# Patient Record
Sex: FEMALE | Race: BLACK OR AFRICAN AMERICAN | NOT HISPANIC OR LATINO | Employment: OTHER | URBAN - METROPOLITAN AREA
[De-identification: names, ages, dates, MRNs, and addresses within clinical notes are randomized per-mention and may not be internally consistent; named-entity substitution may affect disease eponyms.]

---

## 2023-06-13 RX ORDER — ASPIRIN 81 MG/1
81 TABLET ORAL DAILY
COMMUNITY

## 2023-06-13 RX ORDER — AMLODIPINE BESYLATE 10 MG/1
10 TABLET ORAL DAILY
COMMUNITY

## 2023-06-13 RX ORDER — ALLOPURINOL 100 MG/1
200 TABLET ORAL DAILY
COMMUNITY

## 2023-06-13 RX ORDER — DONEPEZIL HYDROCHLORIDE 10 MG/1
10 TABLET, FILM COATED ORAL AT BEDTIME
COMMUNITY

## 2023-06-13 RX ORDER — ATORVASTATIN CALCIUM 20 MG/1
20 TABLET, FILM COATED ORAL DAILY
COMMUNITY

## 2023-06-13 RX ORDER — QUETIAPINE FUMARATE 25 MG/1
25 TABLET, FILM COATED ORAL 2 TIMES DAILY
COMMUNITY

## 2023-06-13 RX ORDER — ATENOLOL 50 MG/1
50 TABLET ORAL DAILY
COMMUNITY

## 2023-06-13 RX ORDER — TORSEMIDE 20 MG/1
20 TABLET ORAL DAILY
COMMUNITY

## 2023-06-14 ENCOUNTER — APPOINTMENT (OUTPATIENT)
Dept: GENERAL RADIOLOGY | Facility: CLINIC | Age: 88
End: 2023-06-14
Attending: PODIATRIST
Payer: MEDICARE

## 2023-06-14 ENCOUNTER — HOSPITAL ENCOUNTER (OUTPATIENT)
Facility: CLINIC | Age: 88
Discharge: HOME | End: 2023-06-14
Attending: PODIATRIST | Admitting: PODIATRIST
Payer: MEDICARE

## 2023-06-14 VITALS
DIASTOLIC BLOOD PRESSURE: 85 MMHG | OXYGEN SATURATION: 100 % | SYSTOLIC BLOOD PRESSURE: 154 MMHG | BODY MASS INDEX: 18.72 KG/M2 | TEMPERATURE: 98.5 F | WEIGHT: 119.3 LBS | HEIGHT: 67 IN | HEART RATE: 77 BPM | RESPIRATION RATE: 14 BRPM

## 2023-06-14 DIAGNOSIS — M20.41 HAMMERTOE OF RIGHT FOOT: ICD-10-CM

## 2023-06-14 DIAGNOSIS — R26.89 UNABLE TO BEAR WEIGHT ON RIGHT LOWER EXTREMITY: Primary | ICD-10-CM

## 2023-06-14 PROCEDURE — 999N000179 XR SURGERY CARM FLUORO LESS THAN 5 MIN W STILLS

## 2023-06-14 PROCEDURE — 360N000083 HC SURGERY LEVEL 3 W/ FLUORO, PER MIN: Performed by: PODIATRIST

## 2023-06-14 PROCEDURE — 250N000009 HC RX 250: Performed by: PODIATRIST

## 2023-06-14 PROCEDURE — 999N000063 XR FOOT PORT RIGHT 2 VIEWS: Mod: RT

## 2023-06-14 PROCEDURE — 272N000001 HC OR GENERAL SUPPLY STERILE: Performed by: PODIATRIST

## 2023-06-14 PROCEDURE — 710N000012 HC RECOVERY PHASE 2, PER MINUTE: Performed by: PODIATRIST

## 2023-06-14 PROCEDURE — 999N000141 HC STATISTIC PRE-PROCEDURE NURSING ASSESSMENT: Performed by: PODIATRIST

## 2023-06-14 RX ORDER — LIDOCAINE HYDROCHLORIDE 20 MG/ML
INJECTION, SOLUTION INFILTRATION; PERINEURAL PRN
Status: DISCONTINUED | OUTPATIENT
Start: 2023-06-14 | End: 2023-06-14 | Stop reason: HOSPADM

## 2023-06-14 RX ORDER — BUPIVACAINE HYDROCHLORIDE 5 MG/ML
INJECTION, SOLUTION PERINEURAL PRN
Status: DISCONTINUED | OUTPATIENT
Start: 2023-06-14 | End: 2023-06-14 | Stop reason: HOSPADM

## 2023-06-14 ASSESSMENT — ACTIVITIES OF DAILY LIVING (ADL)
ADLS_ACUITY_SCORE: 36
ADLS_ACUITY_SCORE: 35

## 2023-06-14 NOTE — DISCHARGE INSTRUCTIONS
Reasons to contact your surgeon:    Signs of possible infection: Check your incision daily for redness, swelling, warmth, red streaks or foul drainage.   Elevated temperature.  Pain not controlled with pain medication and/or rest.   Uncontrolled nausea or vomiting.  Any questions or concerns.       **If you have questions or concerns about your procedure,   call Dr. Wei  [phone]**
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
## 2023-06-14 NOTE — PROGRESS NOTES
[facility] report completed, #[id number].     IKE Duenas, [facility]  [phone] Desk phone  [phone] Cell/text (Preferred)  [facility]

## 2023-06-14 NOTE — OP NOTE
Surgeon:  Na Wei DPM     Procedure:      1. Proximal interphalangeal arthroplasty 2nd digit right foot      Pre-op diagnosis:?  1. Hammertoe deformity 2nd digit right foot  2. Nonhealing wound/Pre-ulcerative callus 2nd digit right foot     Post-op diagnosis:?same      Anesthesia: local      Hemostasis: none     Estimated blood loss: 5cc     Specimen: none     Indications: This is an 89 years old female presents complaining of pain right foot due to painful hammertoe 2nd digit right foot. At the same time, she has been dealing with a pre-ulcerative lesion medial 2nd digit at the PIPJ level that has become an open wound on and off. She has dementia and sometimes is not able to communicate well. Clinically, she has severe rigid hammertoe 2nd digit right foot and mild bunion deformity. She has significant pain on palpation at the callus and at the PIPJ. She doesn't have any pain at the bunion site. On X-ray, there is severe hammertoe deformity of the 2nd digit with significant arthritis and dislocation at the PIPJ. This area corresponds to the corn/ulcer formation and rubbing against the hallux. We discussed hammertoe surgery in detail. I discussed an arthroplasty of the 2nd digit without use of fixation and this can be done under local. I discussed that this would help resolve the pain from the arthritis and the formation of the corn at the area, but won't get rid of the problem 100% due to the presence of the bunion deformity rubbing against the 2nd toe and that there is a risk of recurrence. She verbalizes understanding. Her daughter reported that in the past 2 weeks leading to surgery today, patient has been acting weird due to her dementia and she is afraid of post-op compliance. The procedure however still deems necessary due to the presence of wound and pain with concern for infection in the future. Patient was seen pre-operatively, procedure and post-op course were discussed as well as alternatives, risks  and complications. No guarantees were given. All questions were answered to the patient s satisfactions. Pt agrees to comply and opts to proceed with the operation.       Report of operation:       Patient was brought into the operating room, properly identified and placed on the operating room table in a supine position. Following administration of local anesthesia (10cc of 1:1 2% lidocaine and 0.5% marcaine plain), the right foot was then scrubbed, prepped and draped in the usual sterile and aseptic manner.       On the 2nd toe, two semi elliptical incision was made dorsally on top of the PIPJ.  The incision was deepened in the same plane with care taken to identify and retract all vital neurovascular structures.? All superficial bleeders were cauterized as necessary. The extensor tendon was transected and a transverse capsulotomy was performed and at this time, the head of the proximal phalanx was clearly visualized. There is severe degenerative change to the head of the proximal phalanx as well as to the middle phalanx. The head of the proximal phalanx was freed from its capsular and periosteal attachments.  The head of the proximal phalanx was then resected with a sagittal saw. It was also used to remodel the middle phalanx. A bone rongeur was also utilized to remove spurring formation at the base of the middle phalanx. A rasp was used to smooth down the remaining bones and no sharp edges were noted.  I palpated the area and there were no more bony prominences. At this point, the foot was loaded and it was noted that adequate correction of the deformity was achieved. The pre-ulcerative callus at the medial aspect was pared with #15 blade.        The wounds were then copiously flushed with normal sterile saline.?The EDL tendon was repaired with 4-0 vicryl suture, and the skin was closed with 4-0 nylon sutures.    The incision site was then dressed with adaptic, 4 x 4, kerlix, ACE wrap. Adequate perfusion was  noted to all distal digits.       The patient tolerated the anesthesia and procedure well, without complications, left the operating room with vital signs stable and vascular status intact to all digits of the right foot. Following a period of post-operative monitoring, will be discharged home after being given both oral and written postoperative instruction. I discussed with her daughter about post-op care as well, and daughter will also get in touch with her primary care physician as well as the director of the elderly program she is a part of, to make sure that her post-op recovery goes smoothly.     I was present for the entirety of the case and performed all aspects of the procedure.

## 2025-04-25 ENCOUNTER — HOSPITAL ENCOUNTER (EMERGENCY)
Facility: CLINIC | Age: OVER 89
Discharge: HOME | End: 2025-04-25
Attending: EMERGENCY MEDICINE | Admitting: EMERGENCY MEDICINE
Payer: MEDICARE

## 2025-04-25 ENCOUNTER — APPOINTMENT (OUTPATIENT)
Dept: CT IMAGING | Facility: CLINIC | Age: OVER 89
End: 2025-04-25
Attending: EMERGENCY MEDICINE
Payer: MEDICARE

## 2025-04-25 VITALS
WEIGHT: 121 LBS | RESPIRATION RATE: 18 BRPM | BODY MASS INDEX: 18.95 KG/M2 | DIASTOLIC BLOOD PRESSURE: 78 MMHG | OXYGEN SATURATION: 97 % | SYSTOLIC BLOOD PRESSURE: 156 MMHG | TEMPERATURE: 98.2 F | HEART RATE: 67 BPM

## 2025-04-25 DIAGNOSIS — S09.90XA CLOSED HEAD INJURY, INITIAL ENCOUNTER: ICD-10-CM

## 2025-04-25 DIAGNOSIS — S00.03XA CONTUSION OF SCALP, INITIAL ENCOUNTER: ICD-10-CM
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
## 2025-04-25 PROCEDURE — 99284 EMERGENCY DEPT VISIT MOD MDM: CPT | Mod: 25

## 2025-04-25 PROCEDURE — 70450 CT HEAD/BRAIN W/O DYE: CPT

## 2025-04-25 RX ORDER — ACETAMINOPHEN 325 MG/1
650 TABLET ORAL ONCE
Status: DISCONTINUED | OUTPATIENT
Start: 2025-04-25 | End: 2025-04-25 | Stop reason: HOSPADM

## 2025-04-25 ASSESSMENT — COLUMBIA-SUICIDE SEVERITY RATING SCALE - C-SSRS
6. HAVE YOU EVER DONE ANYTHING, STARTED TO DO ANYTHING, OR PREPARED TO DO ANYTHING TO END YOUR LIFE?: NO
1. IN THE PAST MONTH, HAVE YOU WISHED YOU WERE DEAD OR WISHED YOU COULD GO TO SLEEP AND NOT WAKE UP?: NO
2. HAVE YOU ACTUALLY HAD ANY THOUGHTS OF KILLING YOURSELF IN THE PAST MONTH?: NO

## 2025-04-25 ASSESSMENT — ACTIVITIES OF DAILY LIVING (ADL)
ADLS_ACUITY_SCORE: 42

## 2025-04-25 NOTE — ED PROVIDER NOTES
"  Emergency Department Note      History of Present Illness     Chief Complaint   Fall      HPI   Priscila Montes is a 89 year old female with history of Alzheimer's dementia, arthritis, renal cell carcinoma, type 2 diabetes mellitus, hypertension, and hyperlipidemia who presents to the ED via EMS from [facility] for evaluation after a witnessed fall. EMS reports Priscila was \"clowning around\" with her friends when she stumbled and fell onto her left side, striking her head. She denies loss of consciousness. She is on baby aspirin. She sustained pain to her left parietal scalp. Patient denies neck pain, headache, nausea, vomiting, or any other pain.    Independent Historian   EMS as detailed above.    Review of External Notes   none    Past Medical History     Medical History and Problem List   Anemia  Anxiety  Arthritis  Alzheimer's dementia  Gout  Hyperlipidemia  Hypercholesterolemia  Hypertension  Cholelithiases  Chronic metabolic acidosis  Renal cell carcinoma, left  Generalized muscle weakness  Hyperparathyroidism due to renal insufficiency  Type 2 diabetes mellitus with diabetic peripheral angiopathy  Depression  Systolic murmur  Venous peripheral insufficiency    Medications   Allopurinol  Amlodipine  Aspirin 81 mg  Atenolol  Atorvastatin  Donepezil  Quetiapine  Torsemide  Mirtazapine    Surgical History   Right toe arthroplasty    Physical Exam     Patient Vitals for the past 24 hrs:   BP Temp Temp src Pulse Resp SpO2 Weight   04/25/25 1343 (!) 156/78 98.2  F (36.8  C) Temporal 67 18 97 % 54.9 kg (121 lb)     Physical Exam  Nursing note and vitals reviewed.  Constitutional:  Appears well-developed and well-nourished.   HENT:      Head:    Small amount of swelling and tenderness to left parietal scalp.  Facial bones nontender  Ears:   TM's clear bilaterally  Mouth/Throat:   Oropharynx is clear and moist. No oropharyngeal exudate.   Eyes:    Pupils are equal, round, and reactive to light.   Neck: "    Neck is nontender. Normal range of motion. Neck supple.      No tracheal deviation present. No thyromegaly present.   Cardiovascular:  Normal rate, regular rhythm, no murmur   Pulmonary/Chest: Breath sounds are clear and equal without wheezes or crackles.  Abdominal:   Soft. Bowel sounds are normal. Exhibits no distension and      no mass. There is no tenderness.      There is no rebound and no guarding.   Musculoskeletal:  Exhibits no edema. Nontender arms, legs, and back.  Lymphadenopathy:  No cervical adenopathy.   Neurological:   Alert and oriented to person, place, and time.   Skin:    Skin is warm and dry. No rash noted. No pallor.      Diagnostics     Lab Results   Labs Ordered and Resulted from Time of ED Arrival to Time of ED Departure - No data to display    Imaging   CT Head w/o Contrast   Final Result   IMPRESSION:   1.  No CT evidence for acute intracranial process.      2.  Brain atrophy and presumed chronic microvascular ischemic changes as above.       3.  No fracture of the calvarium or skull base.      4.  Left-sided frontal parietal scalp contusion inferiorly.      5.  Opacification left mastoid air cells, left EAC and middle ear appears chronic with presumed inflammatory left-sided otomastoiditis and fluid/debris left simple mastoidectomy level. Demineralization left ossicular chain series 5 image 34 appears    chronic.        Independent Interpretation   I reviewed her Head CT images which did not show any intracranial bleed or midline shift.  No skull fracture seen.    ED Course      Medications Administered   Medications   acetaminophen (TYLENOL) tablet 650 mg (has no administration in time range)       Procedures   Procedures     Discussion of Management   Spoke with her daughter and updated her with the findings and plan.    ED Course   ED Course as of 04/25/25 1405   Fri Apr 25, 2025   1352 I obtained history and examined the patient as noted above.        Additional  Documentation  None    Medical Decision Making / Diagnosis     CMS Diagnoses: None    MIPS       None    MDM   Priscila Montes is a 89 year old female who arrives to the emergency department due to a mechanical fall with resulting closed head injury with scalp hematoma.  Head CT was performed which did not show any sign of skull fracture or intracranial bleed.  She is mentating normally and ambulatory here.  Head to toe exam did not show any other injuries.  Her cervical spine was cleared clinically since she does not have any neck pain or tenderness.  I felt she be safely discharged home.  There was no concern for syncope or infection at this time.  Precautions were given on signs and symptoms to return for.  Instructions were given to have her follow-up in clinic next week for recheck.    Disposition   The patient was discharged.     Diagnosis     ICD-10-CM    1. Closed head injury, initial encounter  S09.90XA       2. Contusion of scalp, initial encounter  S00.03XA       3. Fall at home, initial encounter  W19.XXXA     Y92.009            Discharge Medications   New Prescriptions    No medications on file         Scribe Disclosure:  I, Lynn Lozano, am serving as a scribe at 2:01 PM on 4/25/2025 to document services personally performed by Paloma Almeida MD based on my observations and the provider's statements to me.        Paloma Almeida MD  04/25/25 9900
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
## 2025-04-25 NOTE — ED NOTES
Bed: ED21  Expected date:   Expected time:   Means of arrival:   Comments:  [place] [address] 89F fall with head strike, no thinners

## (undated) DEVICE — DRAPE SHEET REV FOLD 3/4 9349

## (undated) DEVICE — SU ETHILON 4-0 FS-2 18" 662H

## (undated) DEVICE — LINEN TOWEL PACK X5 5464

## (undated) DEVICE — DRAPE MINI C-ARM 4003

## (undated) DEVICE — PACK EXTREMITY SOP15EXFSD

## (undated) DEVICE — SU VICRYL 4-0 PS-2 18" UND J496H

## (undated) DEVICE — PREP CHLORAPREP 26ML TINTED HI-LITE ORANGE 930815

## (undated) DEVICE — DRAPE STERI TOWEL LG 1010

## (undated) DEVICE — ESU GROUND PAD UNIVERSAL W/O CORD

## (undated) DEVICE — BLADE SAW OSCIL/SAG STRK MICRO 5.5X25X0.38MM 2296-003-524

## (undated) DEVICE — SU PROLENE 4-0 PC-3 18" 8634G

## (undated) DEVICE — SOL NACL 0.9% IRRIG 1000ML BOTTLE 2F7124

## (undated) RX ORDER — BUPIVACAINE HYDROCHLORIDE 5 MG/ML
INJECTION, SOLUTION EPIDURAL; INTRACAUDAL
Status: DISPENSED
Start: 2023-06-14

## (undated) RX ORDER — LIDOCAINE HYDROCHLORIDE 20 MG/ML
INJECTION, SOLUTION INFILTRATION; PERINEURAL
Status: DISPENSED
Start: 2023-06-14